# Patient Record
Sex: FEMALE | Race: WHITE | URBAN - METROPOLITAN AREA
[De-identification: names, ages, dates, MRNs, and addresses within clinical notes are randomized per-mention and may not be internally consistent; named-entity substitution may affect disease eponyms.]

---

## 2019-06-20 NOTE — PATIENT DISCUSSION
*Cataract Counseling: The patient's cataract is  becoming visually significant. Patient desires not to proceed with surgery at this time. I have discussed continuing with current spectacles versus updating. I have offered the patient a new spectacle prescription to fill if desires. Return to follow up as scheduled or sooner if symptoms arise.

## 2019-06-20 NOTE — PATIENT DISCUSSION
Pt has a significant head tremor which makes performing a yag capsulotomy challenging. Pt is currently happy with her vision in the right eye.

## 2019-11-21 NOTE — PATIENT DISCUSSION
- Follow up for cataract testing next available.   Will likely want a standard monofocal IOL to match OD (Dr. Adeola Diaz, 2018)

## 2019-12-19 NOTE — PATIENT DISCUSSION
Continue: prednisol ace-gatiflox-bromfen (prednisol ace-gatiflox-bromfen): drops,suspension: 1-0.5-0.075% 1 drop three times a day into affected eye 12-

## 2020-02-13 NOTE — PATIENT DISCUSSION
- Follow up in 6 months for [facility]

## 2022-03-09 ENCOUNTER — PREPPED CHART (OUTPATIENT)
Dept: URBAN - METROPOLITAN AREA CLINIC 52 | Facility: CLINIC | Age: 82
End: 2022-03-09